# Patient Record
Sex: FEMALE | Race: WHITE | NOT HISPANIC OR LATINO | URBAN - METROPOLITAN AREA
[De-identification: names, ages, dates, MRNs, and addresses within clinical notes are randomized per-mention and may not be internally consistent; named-entity substitution may affect disease eponyms.]

---

## 2017-08-31 ENCOUNTER — IMPORTED ENCOUNTER (OUTPATIENT)
Dept: URBAN - METROPOLITAN AREA CLINIC 1 | Facility: CLINIC | Age: 32
End: 2017-08-31

## 2017-08-31 PROBLEM — Z01.00: Noted: 2017-08-31

## 2017-08-31 PROCEDURE — S0621 ROUTINE OPHTHALMOLOGICAL EXA: HCPCS

## 2017-08-31 NOTE — PATIENT DISCUSSION
1.  Routine Exam- Patient has minimal refractive error. All conditions discussed. 2.  S/p PRK OUReturn for an appointment in 1 year 36 with Dr. Ke Davis.

## 2017-11-14 ENCOUNTER — IMPORTED ENCOUNTER (OUTPATIENT)
Dept: URBAN - METROPOLITAN AREA CLINIC 1 | Facility: CLINIC | Age: 32
End: 2017-11-14

## 2017-11-14 PROBLEM — S05.02XA: Noted: 2017-11-14

## 2017-11-14 PROCEDURE — 92012 INTRM OPH EXAM EST PATIENT: CPT

## 2017-11-14 NOTE — PATIENT DISCUSSION
1.  Corneal Abrasion OS (Active) : Possibly fingernail. DARSHANA QHS OS and PF AT Q1H. ERx Tobrex QID OS2. Return for an appointment in 1 week for 10. with Dr. Kevin Hanks.

## 2018-12-14 ENCOUNTER — IMPORTED ENCOUNTER (OUTPATIENT)
Dept: URBAN - METROPOLITAN AREA CLINIC 1 | Facility: CLINIC | Age: 33
End: 2018-12-14

## 2018-12-14 PROBLEM — Z01.00: Noted: 2018-12-14

## 2018-12-14 PROCEDURE — S0621 ROUTINE OPHTHALMOLOGICAL EXA: HCPCS

## 2018-12-14 NOTE — PATIENT DISCUSSION
1.  Routine Exam- Patient has minimal refractive error. All conditions discussed with patient and parent today. 2.  Corneal Abrasion OD - accidentally poked herself in the eye today PF ATs Q1-2H OD 3. S/p [id number] Stark for an appointment in 1 year 36 with Dr. Myra Rojas.

## 2019-12-16 ENCOUNTER — IMPORTED ENCOUNTER (OUTPATIENT)
Dept: URBAN - METROPOLITAN AREA CLINIC 1 | Facility: CLINIC | Age: 34
End: 2019-12-16

## 2019-12-16 PROBLEM — H52.13: Noted: 2019-12-16

## 2019-12-16 PROCEDURE — S0621 ROUTINE OPHTHALMOLOGICAL EXA: HCPCS

## 2019-12-16 NOTE — PATIENT DISCUSSION
1. Myopia- MRX for glasses given. 2.  SATYA OU- Pt c/o waking up with dryness OU. Recommend AT's BID OU (sample of Systane Complete given)routinely and gel drops OU at bedtime (sample of Blink gel drops given) . 3.  H/o Corneal Abrasion OD - 4. S/p Ljsmalachi 86 OU 2016Return for an appointment in 1 year 36 with Dr. Anish Myles.

## 2021-02-09 ENCOUNTER — IMPORTED ENCOUNTER (OUTPATIENT)
Dept: URBAN - METROPOLITAN AREA CLINIC 1 | Facility: CLINIC | Age: 36
End: 2021-02-09

## 2021-02-09 PROBLEM — H52.223: Noted: 2021-02-09

## 2021-02-09 PROBLEM — H52.13: Noted: 2021-02-09

## 2021-02-09 PROCEDURE — S0621 ROUTINE OPHTHALMOLOGICAL EXA: HCPCS

## 2021-02-09 NOTE — PATIENT DISCUSSION
1. Myopia w/ Astigmatism OU -- Rx was given for correction if indicated and requested. 2. Dry Eyes OU -- Cont the use of ATs BID OU routinely. 3.  S/p PRK (2016; PMG)Return for an appointment in 1 year 36 with Dr. Marta Mcdaniel.

## 2022-04-02 ASSESSMENT — VISUAL ACUITY
OD_CC: 20/20
OD_SC: J1+
OS_CC: 20/20
OS_CC: 20/20
OD_SC: J1+
OD_CC: 20/20
OS_CC: 20/20
OS_CC: 20/20
OD_CC: 20/20
OD_CC: 20/20
OS_SC: J1+
OD_CC: 20/20
OS_SC: J1+
OS_CC: 20/20
OD_SC: J1+
OS_SC: J1+

## 2022-04-02 ASSESSMENT — TONOMETRY
OD_IOP_MMHG: 13
OD_IOP_MMHG: 14
OD_IOP_MMHG: 14
OS_IOP_MMHG: 14
OD_IOP_MMHG: 14
OS_IOP_MMHG: 13
OS_IOP_MMHG: 13
OD_IOP_MMHG: 15
OS_IOP_MMHG: 14

## 2022-04-18 ENCOUNTER — COMPREHENSIVE EXAM (OUTPATIENT)
Dept: URBAN - METROPOLITAN AREA CLINIC 1 | Facility: CLINIC | Age: 37
End: 2022-04-18

## 2022-04-18 DIAGNOSIS — H52.13: ICD-10-CM

## 2022-04-18 DIAGNOSIS — H52.223: ICD-10-CM

## 2022-04-18 PROCEDURE — S0621 ROUTINE OPHTHALMOLOGICAL EXA: HCPCS

## 2022-04-18 ASSESSMENT — TONOMETRY
OS_IOP_MMHG: 14
OD_IOP_MMHG: 14

## 2022-04-18 ASSESSMENT — VISUAL ACUITY
OD_SC: J1+
OS_SC: J1+
OS_SC: 20/25
OD_SC: 20/25

## 2023-05-15 ENCOUNTER — COMPREHENSIVE EXAM (OUTPATIENT)
Dept: URBAN - METROPOLITAN AREA CLINIC 1 | Facility: CLINIC | Age: 38
End: 2023-05-15

## 2023-05-15 DIAGNOSIS — H52.223: ICD-10-CM

## 2023-05-15 DIAGNOSIS — H52.13: ICD-10-CM

## 2023-05-15 PROCEDURE — 92014 COMPRE OPH EXAM EST PT 1/>: CPT

## 2023-05-15 PROCEDURE — 92015 DETERMINE REFRACTIVE STATE: CPT

## 2023-05-15 ASSESSMENT — VISUAL ACUITY
OS_CC: J1+
OS_CC: 20/20
OS_SC: 20/25
OD_SC: J1+
OD_SC: 20/20
OD_CC: 20/20
OS_SC: J1+
OD_CC: J1+

## 2023-05-15 ASSESSMENT — TONOMETRY
OS_IOP_MMHG: 11
OD_IOP_MMHG: 13

## 2024-01-24 ENCOUNTER — EMERGENCY VISIT (OUTPATIENT)
Dept: URBAN - METROPOLITAN AREA CLINIC 1 | Facility: CLINIC | Age: 39
End: 2024-01-24

## 2024-01-24 DIAGNOSIS — H16.203: ICD-10-CM

## 2024-01-24 PROCEDURE — 92012 INTRM OPH EXAM EST PATIENT: CPT

## 2024-01-24 RX ORDER — PREDNISOLONE ACETATE 10 MG/ML: 1 SUSPENSION/ DROPS OPHTHALMIC

## 2024-01-24 ASSESSMENT — VISUAL ACUITY
OS_SC: 20/25+1
OD_SC: 20/25-1

## 2024-01-24 ASSESSMENT — TONOMETRY
OD_IOP_MMHG: 14
OS_IOP_MMHG: 12

## 2024-01-30 ENCOUNTER — FOLLOW UP (OUTPATIENT)
Dept: URBAN - METROPOLITAN AREA CLINIC 1 | Facility: CLINIC | Age: 39
End: 2024-01-30

## 2024-01-30 DIAGNOSIS — H16.203: ICD-10-CM

## 2024-01-30 PROCEDURE — 92012 INTRM OPH EXAM EST PATIENT: CPT

## 2024-01-30 ASSESSMENT — TONOMETRY
OS_IOP_MMHG: 13
OD_IOP_MMHG: 15

## 2024-01-30 ASSESSMENT — VISUAL ACUITY
OS_SC: 20/20-2
OD_SC: 20/20-2

## 2024-07-10 ENCOUNTER — COMPREHENSIVE EXAM (OUTPATIENT)
Dept: URBAN - METROPOLITAN AREA CLINIC 1 | Facility: CLINIC | Age: 39
End: 2024-07-10

## 2024-07-10 DIAGNOSIS — Z01.00: ICD-10-CM

## 2024-07-10 DIAGNOSIS — H52.13: ICD-10-CM

## 2024-07-10 DIAGNOSIS — H52.223: ICD-10-CM

## 2024-07-10 PROCEDURE — 92015 DETERMINE REFRACTIVE STATE: CPT

## 2024-07-10 PROCEDURE — 92014 COMPRE OPH EXAM EST PT 1/>: CPT

## 2024-07-10 ASSESSMENT — VISUAL ACUITY
OS_SC: J1+
OD_SC: 20/20-1
OS_SC: 20/25+2
OD_SC: J1+

## 2024-07-10 ASSESSMENT — TONOMETRY
OD_IOP_MMHG: 11
OS_IOP_MMHG: 9